# Patient Record
Sex: FEMALE | Race: BLACK OR AFRICAN AMERICAN
[De-identification: names, ages, dates, MRNs, and addresses within clinical notes are randomized per-mention and may not be internally consistent; named-entity substitution may affect disease eponyms.]

---

## 2017-01-14 ENCOUNTER — HOSPITAL ENCOUNTER (EMERGENCY)
Dept: HOSPITAL 61 - ER | Age: 22
Discharge: HOME | End: 2017-01-14
Payer: COMMERCIAL

## 2017-01-14 VITALS — DIASTOLIC BLOOD PRESSURE: 81 MMHG | SYSTOLIC BLOOD PRESSURE: 130 MMHG

## 2017-01-14 VITALS — BODY MASS INDEX: 20.83 KG/M2 | HEIGHT: 65 IN | WEIGHT: 125 LBS

## 2017-01-14 DIAGNOSIS — N93.9: Primary | ICD-10-CM

## 2017-01-14 LAB
ALBUMIN SERPL-MCNC: 3.5 G/DL (ref 3.4–5)
ALP SERPL-CCNC: 39 U/L (ref 46–116)
ALT SERPL-CCNC: 18 U/L (ref 14–59)
ANION GAP SERPL CALC-SCNC: 8 MMOL/L (ref 6–14)
AST SERPL-CCNC: 12 U/L (ref 15–37)
BACTERIA #/AREA URNS HPF: (no result) /HPF
BASOPHILS # BLD AUTO: 0 X10^3/UL (ref 0–0.2)
BASOPHILS NFR BLD: 0 % (ref 0–3)
BILIRUB DIRECT SERPL-MCNC: 0.1 MG/DL (ref 0–0.2)
BILIRUB SERPL-MCNC: 0.4 MG/DL (ref 0.2–1)
BILIRUB UR QL STRIP: NEGATIVE
BUN SERPL-MCNC: 10 MG/DL (ref 7–20)
CALCIUM SERPL-MCNC: 8.7 MG/DL (ref 8.5–10.1)
CHLORIDE SERPL-SCNC: 108 MMOL/L (ref 98–107)
CO2 SERPL-SCNC: 26 MMOL/L (ref 21–32)
CREAT SERPL-MCNC: 0.9 MG/DL (ref 0.6–1)
EOSINOPHIL NFR BLD: 2 % (ref 0–3)
ERYTHROCYTE [DISTWIDTH] IN BLOOD BY AUTOMATED COUNT: 17.3 % (ref 11.5–14.5)
GFR SERPLBLD BASED ON 1.73 SQ M-ARVRAT: 79 ML/MIN
GLUCOSE SERPL-MCNC: 86 MG/DL (ref 70–99)
GLUCOSE UR STRIP-MCNC: NEGATIVE MG/DL
HCT VFR BLD CALC: 36.9 % (ref 36–47)
HGB BLD-MCNC: 12 G/DL (ref 12–15.5)
LYMPHOCYTES # BLD: 1.5 X10^3/UL (ref 1–4.8)
LYMPHOCYTES NFR BLD AUTO: 46 % (ref 24–48)
MCH RBC QN AUTO: 24 PG (ref 25–35)
MCHC RBC AUTO-ENTMCNC: 32 G/DL (ref 31–37)
MCV RBC AUTO: 75 FL (ref 79–100)
MONOCYTES NFR BLD: 13 % (ref 0–9)
NEG OBC UR: (no result)
NEUTROPHILS NFR BLD AUTO: 40 % (ref 31–73)
NITRITE UR QL STRIP: NEGATIVE
PH UR STRIP: 6 [PH]
PLATELET # BLD AUTO: 305 X10^3/UL (ref 140–400)
POS OBC UR: (no result)
POTASSIUM SERPL-SCNC: 3.5 MMOL/L (ref 3.5–5.1)
PROT SERPL-MCNC: 7.4 G/DL (ref 6.4–8.2)
PROT UR STRIP-MCNC: NEGATIVE MG/DL
RBC # BLD AUTO: 4.95 X10^6/UL (ref 3.5–5.4)
RBC #/AREA URNS HPF: (no result) /HPF (ref 0–2)
SODIUM SERPL-SCNC: 142 MMOL/L (ref 136–145)
SP GR UR STRIP: 1.02
SQUAMOUS #/AREA URNS LPF: (no result) /LPF
UROBILINOGEN UR-MCNC: 0.2 MG/DL
WBC # BLD AUTO: 3.4 X10^3/UL (ref 4–11)

## 2017-01-14 PROCEDURE — 87186 SC STD MICRODIL/AGAR DIL: CPT

## 2017-01-14 PROCEDURE — 83690 ASSAY OF LIPASE: CPT

## 2017-01-14 PROCEDURE — 80076 HEPATIC FUNCTION PANEL: CPT

## 2017-01-14 PROCEDURE — 80048 BASIC METABOLIC PNL TOTAL CA: CPT

## 2017-01-14 PROCEDURE — 86850 RBC ANTIBODY SCREEN: CPT

## 2017-01-14 PROCEDURE — 85027 COMPLETE CBC AUTOMATED: CPT

## 2017-01-14 PROCEDURE — 86901 BLOOD TYPING SEROLOGIC RH(D): CPT

## 2017-01-14 PROCEDURE — 36415 COLL VENOUS BLD VENIPUNCTURE: CPT

## 2017-01-14 PROCEDURE — 87086 URINE CULTURE/COLONY COUNT: CPT

## 2017-01-14 PROCEDURE — 81001 URINALYSIS AUTO W/SCOPE: CPT

## 2017-01-14 PROCEDURE — 81025 URINE PREGNANCY TEST: CPT

## 2017-01-14 PROCEDURE — 84702 CHORIONIC GONADOTROPIN TEST: CPT

## 2017-01-14 PROCEDURE — 86900 BLOOD TYPING SEROLOGIC ABO: CPT

## 2017-01-14 NOTE — PHYS DOC
Past Medical History


Past Medical History:  No Pertinent History


Past Surgical History:  No Surgical History


Alcohol Use:  None


Drug Use:  None





Adult General


Chief Complaint


Chief Complaint:  VAGINAL BLEEDING PREGNANCY





HPI


HPI


21-year-old female presenting to the emergency department with vaginal 

bleeding. She reports being pregnant last menstrual period was at the beginning 

of December. She reports having a positive pregnancy at home and at the 

[facility]. She currently scheduled for an ultrasound in a few weeks. 

Approximately 2 days ago she started having abdominal pain in the lower pelvic 

region. It is associated with cramping. She also reports lower back pain as 

well that time. She then approximate 6 hours after this noted cramping 

sensation with vaginal bleeding. This is been present for the last 6 hours. She 

denies nausea vomiting chest pain or shortness of breath.





The pain is sharp moderate nonradiating and without alleviating factors.





Review of Systems


Review of Systems


ROS negative for chest pain shortness of breath fevers chills. She denies 

cough.  All other review of systems is negative unless otherwise noted in 

history of present illness.





Allergies


Allergies





 Allergies








Coded Allergies Type Severity Reaction Last Updated Verified


 


  No Known Drug Allergies    1/14/17 No











Physical Exam


Physical Exam





Constitutional: Well developed, well nourished, no acute distress, non-toxic 

appearance. 


HENT: Normocephalic, atraumatic, bilateral external ears normal, oropharynx 

moist, no oral exudates, nose normal. []


Eyes: PERRLA, EOMI, conjunctiva normal, no discharge.  


Neck: Normal range of motion, no tenderness, supple, no stridor. [] 


Cardiovascular:Heart rate regular rhythm, no murmur []


Lungs & Thorax:  Bilateral breath sounds clear to auscultation 


Abdomen:  Soft nontender abdomen without rebound tenderness or guarding 

present. Negative McBurneys point. Negative Calhoun sign. No ecchymosis present.


Vaginal exam performed in the presence of a female nurse shows bleeding in the 

vaginal vault. Cervical os is unavailable to be visualized due to bleeding.


Skin: Warm, dry, no erythema, no rash. [] 


Back: No tenderness, no CVA tenderness.  


Extremities: No tenderness, no cyanosis, no clubbing, ROM intact, no edema. [] 


Neurologic: Alert and oriented X 3, normal motor function, normal sensory 

function, no focal deficits noted. []


Psychologic: Affect normal, judgement normal, mood normal. []





Current Patient Data


Vital Signs





 Vital Signs








  Date Time  Temp Pulse Resp B/P Pulse Ox O2 Delivery O2 Flow Rate FiO2


 


1/14/17 09:57 97.9 98 14 130/81 100 Room Air  





 97.9       








Lab Values





 Laboratory Tests








Test


  1/14/17


10:06 1/14/17


10:19


 


Urine Collection Type Void   


 


Urine Color Yellow   


 


Urine Clarity Clear   


 


Urine pH 6.0   


 


Urine Specific Gravity 1.025   


 


Urine Protein


  Negativemg/dL


(NEG-TRACE) 


 


 


Urine Glucose (UA)


  Negativemg/dL


(NEG) 


 


 


Urine Ketones (Stick)


  Negativemg/dL


(NEG) 


 


 


Urine Blood Large (NEG)   


 


Urine Nitrite


  Negative (NEG)


  


 


 


Urine Bilirubin


  Negative (NEG)


  


 


 


Urine Urobilinogen Dipstick


  0.2mg/dL (0.2


mg/dL) 


 


 


Urine Leukocyte Esterase


  Moderate (NEG)


  


 


 


Urine RBC


  6-10/HPF (0-2)


  


 


 


Urine WBC


  11-20/HPF


(0-4) 


 


 


Urine Squamous Epithelial


Cells Many/LPF  


  


 


 


Urine Bacteria


  Many/HPF


(0-FEW) 


 


 


Urine Mucus Marked/LPF   


 


Urine Pregnancy Test


  Negative (NEG)


  


 


 


White Blood Count


  


  3.4x10^3/uL


(4.0-11.0)  L


 


Red Blood Count


  


  4.95x10^6/uL


(3.50-5.40)


 


Hemoglobin


  


  12.0g/dL


(12.0-15.5)


 


Hematocrit


  


  36.9%


(36.0-47.0)


 


Mean Corpuscular Volume


  


  75fL ()


L


 


Mean Corpuscular Hemoglobin  24pg (25-35)  L


 


Mean Corpuscular Hemoglobin


Concent 


  32g/dL (31-37)


 


 


Red Cell Distribution Width


  


  17.3%


(11.5-14.5)  H


 


Platelet Count


  


  305x10^3/uL


(140-400)


 


Neutrophils (%) (Auto)  40% (31-73)  


 


Lymphocytes (%) (Auto)  46% (24-48)  


 


Monocytes (%) (Auto)  13% (0-9)  H


 


Eosinophils (%) (Auto)  2% (0-3)  


 


Basophils (%) (Auto)  0% (0-3)  


 


Neutrophils # (Auto)


  


  1.3x10^3uL


(1.8-7.7)  L


 


Lymphocytes # (Auto)


  


  1.5x10^3/uL


(1.0-4.8)


 


Monocytes # (Auto)


  


  0.4x10^3/uL


(0.0-1.1)


 


Eosinophils # (Auto)


  


  0.1x10^3/uL


(0.0-0.7)


 


Basophils # (Auto)


  


  0.0x10^3/uL


(0.0-0.2)


 


Maternal Serum HCG Beta


Subunit 


  < 1mIU/mL


(0-6)


 


Sodium Level


  


  142mmol/L


(136-145)


 


Potassium Level


  


  3.5mmol/L


(3.5-5.1)


 


Chloride Level


  


  108mmol/L


()  H


 


Carbon Dioxide Level


  


  26mmol/L


(21-32)


 


Anion Gap  8 (6-14)  


 


Blood Urea Nitrogen


  


  10mg/dL (7-20)


 


 


Creatinine


  


  0.9mg/dL


(0.6-1.0)


 


Estimated GFR


(Cockcroft-Gault) 


  79.0  


 


 


Glucose Level


  


  86mg/dL


(70-99)


 


Calcium Level


  


  8.7mg/dL


(8.5-10.1)


 


Total Bilirubin


  


  0.4mg/dL


(0.2-1.0)


 


Direct Bilirubin


  


  0.1mg/dL


(0.0-0.2)


 


Aspartate Amino Transferase


(AST) 


  12U/L (15-37)


L


 


Alanine Aminotransferase (ALT)  18U/L (14-59)  


 


Alkaline Phosphatase


  


  39U/L ()


L


 


Total Protein


  


  7.4g/dL


(6.4-8.2)


 


Albumin


  


  3.5g/dL


(3.4-5.0)


 


Lipase


  


  131U/L


()





 Laboratory Tests


1/14/17 10:19








 Laboratory Tests


1/14/17 10:19














EKG


EKG


[]





Radiology/Procedures


Radiology/Procedures


[]





Course & Med Decision Making


Course & Med Decision Making


Pertinent Labs and Imaging studies reviewed. (See chart for details)





21-year-old female presenting to the emergency department today with vaginal 

bleeding and abdominal pain. She reported positive pregnancy test previously. 

Here her urinary pregnancy test was negative. She denies any history of vaginal 

bleeding over the last 2 weeks or so. It is unusual that her urine pregnancy 

test would be negative if this were an undergoing current miscarriage. On 

evaluation the patient's vital signs were unremarkable. Physical exam showed 

bleeding on vaginal exam otherwise unremarkable. Labs were obtained which 

showed normal CBC with mild leukopenia. Urinalysis shows possible urinary tract 

infection. Chemistry panel unremarkable. Blood type shows Rh+. Beta hCG both in 

urine and blood were negative for pregnancy. I informed her of the findings. I 

then referred her to our gynecology/obstetricians for further evaluation in the 

outpatient setting. She is provided oral pain medication upon discharge.





Dragon Disclaimer


Dragon Disclaimer


This electronic medical record was generated, in whole or in part, using a 

voice recognition dictation system.





Departure


Departure


Impression:  


 Primary Impression:  


 Vaginal bleeding


Disposition:  01 HOME, SELF-CARE


Condition:  STABLE


Referrals:  


NO PCP (PCP)








YENY BIRCH Jr, MD


Patient Instructions:  Vaginal Bleeding During Pregnancy, First Trimester





Additional Instructions:


Thank you for allowing us to participate in your care today.





Followup with your primary care physician in 3 days if your symptoms do not 

improve. If you do not have a primary care provider you can ask for a list of 

our primary care providers. Return to the emergency department you have any new 

or concerning findings.





This should be evaluated by the primary care physician and any necessary 

consulting services for continued management within a few days after discharge. 

Return to emergency room if you have any  new or concerning symptoms including 

but not limited to fever, chills, nausea, vomiting, intractable pain, any new 

rashes, chest pain, shortness of air, uncontrolled bleeding, difficulty 

breathing, and/or vision loss.





You may have been prescribed medication that can change in your level of 

thinking and ability to operate machinery. These medications include 

hydrocodone and Ativan. Also, Benadryl has been known to do this as well. Be 

sure to check with your pharmacist and ask if the medications you've prescribed 

can affect your level of consciousness. I recommend not operating heavy 

machinery or driving while on medication such as these.


Scripts


Nitrofurantoin Monohyd/M-Cryst (Macrobid 100 Mg Capsule)100 Mg Capsule1 Cap PO 

BID #10 CAP


   Prov:BLAISE JEREZ MD         1/14/17


Hydrocodone Bit/Acetaminophen (Hydrocodone-Apap 5-325  **)1 Each Tablet1 Tab PO 

PRN Q6HRS PRN PAIN #8 TAB


   Be careful as this medication may cause you to be drowsy or


   tired. Do not drive on this medication.


   Prov:BLAISE JEREZ MD         1/14/17








BLAISE JEREZ MD Jan 14, 2017 11:15

## 2017-01-16 NOTE — VNOTE
CALL BACK NOTE


CALL BACK





Microbiology


1/14/17 Urine Culture - Final, Complete


          


1/14/17 Urine Culture Result 1 (KENNY) - Final, Complete


          


1/14/17 Antimicrobic Susceptibility - Final, Complete


          


She was seen here for uterine bleeding. She was placed on Macrobid at 

discharge. Urine culture was positive for Escherichia coli with susceptibility 

noted with Macrobid no change in antibiotics needed at this time.








MARY ANN COLLINS NP Jan 16, 2017 13:58

## 2018-06-04 ENCOUNTER — HOSPITAL ENCOUNTER (EMERGENCY)
Dept: HOSPITAL 61 - ER | Age: 23
Discharge: HOME | End: 2018-06-04
Payer: SELF-PAY

## 2018-06-04 DIAGNOSIS — A08.4: Primary | ICD-10-CM

## 2018-06-04 DIAGNOSIS — J20.9: ICD-10-CM

## 2018-06-04 LAB — URINE HCG POC: (no result)

## 2018-06-04 PROCEDURE — 99283 EMERGENCY DEPT VISIT LOW MDM: CPT

## 2018-06-04 PROCEDURE — 81025 URINE PREGNANCY TEST: CPT

## 2021-12-30 ENCOUNTER — HOSPITAL ENCOUNTER (EMERGENCY)
Dept: HOSPITAL 61 - ER | Age: 26
Discharge: HOME | End: 2021-12-30
Payer: SELF-PAY

## 2021-12-30 VITALS — DIASTOLIC BLOOD PRESSURE: 59 MMHG | SYSTOLIC BLOOD PRESSURE: 103 MMHG

## 2021-12-30 VITALS — BODY MASS INDEX: 21.8 KG/M2 | WEIGHT: 123.02 LBS | HEIGHT: 63 IN

## 2021-12-30 DIAGNOSIS — U07.1: Primary | ICD-10-CM

## 2021-12-30 DIAGNOSIS — B34.9: ICD-10-CM

## 2021-12-30 LAB
ALBUMIN SERPL-MCNC: 3.9 G/DL (ref 3.4–5)
ALBUMIN/GLOB SERPL: 1.1 {RATIO} (ref 1–1.7)
ALP SERPL-CCNC: 44 U/L (ref 46–116)
ALT SERPL-CCNC: 24 U/L (ref 14–59)
ANION GAP SERPL CALC-SCNC: 15 MMOL/L (ref 6–14)
APTT PPP: YELLOW S
AST SERPL-CCNC: 13 U/L (ref 15–37)
BACTERIA #/AREA URNS HPF: 0 /HPF
BASOPHILS # BLD AUTO: 0 X10^3/UL (ref 0–0.2)
BASOPHILS NFR BLD: 1 % (ref 0–3)
BILIRUB SERPL-MCNC: 0.3 MG/DL (ref 0.2–1)
BILIRUB UR QL STRIP: NEGATIVE
BUN SERPL-MCNC: 6 MG/DL (ref 7–20)
BUN/CREAT SERPL: 7 (ref 6–20)
CALCIUM SERPL-MCNC: 8.5 MG/DL (ref 8.5–10.1)
CHLORIDE SERPL-SCNC: 101 MMOL/L (ref 98–107)
CO2 SERPL-SCNC: 22 MMOL/L (ref 21–32)
CREAT SERPL-MCNC: 0.9 MG/DL (ref 0.6–1)
EOSINOPHIL NFR BLD: 0 X10^3/UL (ref 0–0.7)
EOSINOPHIL NFR BLD: 1 % (ref 0–3)
ERYTHROCYTE [DISTWIDTH] IN BLOOD BY AUTOMATED COUNT: 16.6 % (ref 11.5–14.5)
FIBRINOGEN PPP-MCNC: CLEAR MG/DL
GFR SERPLBLD BASED ON 1.73 SQ M-ARVRAT: 91.6 ML/MIN
GLUCOSE SERPL-MCNC: 100 MG/DL (ref 70–99)
HCT VFR BLD CALC: 30.9 % (ref 36–47)
HGB BLD-MCNC: 10.5 G/DL (ref 12–15.5)
INFLUENZA A PATIENT: NEGATIVE
INFLUENZA B PATIENT: NEGATIVE
LYMPHOCYTES # BLD: 0.2 X10^3/UL (ref 1–4.8)
LYMPHOCYTES NFR BLD AUTO: 5 % (ref 24–48)
MCH RBC QN AUTO: 26 PG (ref 25–35)
MCHC RBC AUTO-ENTMCNC: 34 G/DL (ref 31–37)
MCV RBC AUTO: 76 FL (ref 79–100)
MONO #: 0.5 X10^3/UL (ref 0–1.1)
MONOCYTES NFR BLD: 11 % (ref 0–9)
NEUT #: 3.8 X10^3/UL (ref 1.8–7.7)
NEUTROPHILS NFR BLD AUTO: 83 % (ref 31–73)
NITRITE UR QL STRIP: NEGATIVE
PH UR STRIP: 6.5 [PH]
PLATELET # BLD AUTO: 245 X10^3/UL (ref 140–400)
POTASSIUM SERPL-SCNC: 3.6 MMOL/L (ref 3.5–5.1)
PROT SERPL-MCNC: 7.6 G/DL (ref 6.4–8.2)
PROT UR STRIP-MCNC: NEGATIVE MG/DL
RBC # BLD AUTO: 4.07 X10^6/UL (ref 3.5–5.4)
RBC #/AREA URNS HPF: 0 /HPF (ref 0–2)
SODIUM SERPL-SCNC: 138 MMOL/L (ref 136–145)
UROBILINOGEN UR-MCNC: 0.2 MG/DL
WBC # BLD AUTO: 4.6 X10^3/UL (ref 4–11)
WBC #/AREA URNS HPF: (no result) /HPF (ref 0–4)

## 2021-12-30 PROCEDURE — 87426 SARSCOV CORONAVIRUS AG IA: CPT

## 2021-12-30 PROCEDURE — 85025 COMPLETE CBC W/AUTO DIFF WBC: CPT

## 2021-12-30 PROCEDURE — 81001 URINALYSIS AUTO W/SCOPE: CPT

## 2021-12-30 PROCEDURE — 87804 INFLUENZA ASSAY W/OPTIC: CPT

## 2021-12-30 PROCEDURE — 99283 EMERGENCY DEPT VISIT LOW MDM: CPT

## 2021-12-30 PROCEDURE — 96361 HYDRATE IV INFUSION ADD-ON: CPT

## 2021-12-30 PROCEDURE — 87086 URINE CULTURE/COLONY COUNT: CPT

## 2021-12-30 PROCEDURE — U0003 INFECTIOUS AGENT DETECTION BY NUCLEIC ACID (DNA OR RNA); SEVERE ACUTE RESPIRATORY SYNDROME CORONAVIRUS 2 (SARS-COV-2) (CORONAVIRUS DISEASE [COVID-19]), AMPLIFIED PROBE TECHNIQUE, MAKING USE OF HIGH THROUGHPUT TECHNOLOGIES AS DESCRIBED BY CMS-2020-01-R: HCPCS

## 2021-12-30 PROCEDURE — 96374 THER/PROPH/DIAG INJ IV PUSH: CPT

## 2021-12-30 PROCEDURE — 80053 COMPREHEN METABOLIC PANEL: CPT

## 2021-12-30 PROCEDURE — 36415 COLL VENOUS BLD VENIPUNCTURE: CPT

## 2021-12-30 NOTE — PHYS DOC
Past Medical History


Past Medical History:  No Pertinent History


Past Surgical History:  No Surgical History


Smoking Status:  Never Smoker


Alcohol Use:  None


Drug Use:  None





General Adult


EDM:


Chief Complaint:  FLU SYMPTOM





HPI:


HPI:





Patient is a 26 year old female who presents with congestion, headache, 

diarrhea, cough and body aches.  Her symptoms started over 1 week ago with 

congestion, since that time she has developed her other symptoms.  Only the last

2 days she has had cough and body aches.  She reports that she has low back pain

that radiates across her groin into her bilateral lower extremities.  She denies

fever, chills, sore throat, chest pain, palpitations, shortness of breath, vo

miting or constipation. 





Patient reports that she has had kidney stones in the past, but this is not 

similar to the renal colicky pain associated.  She is also currently on her 

menstrual period.





Review of Systems:


Review of Systems:


Constitutional:   See HPI


Eyes:   Denies change in visual acuity or visual field deficits.


HENT:   See HPI


Respiratory:   See HPI


Cardiovascular:   See HPI


GI:   See HPI


:  Denies dysuria or hematuria. 


Musculoskeletal:   See HPI 


Integument:   Denies rash or other skin lesions.


Neurologic:   Denies headache, focal weakness or sensory changes.





Heart Score:


C/O Chest Pain:  No





Allergies:


Allergies:





Allergies








Coded Allergies Type Severity Reaction Last Updated Verified


 


  No Known Drug Allergies    6/4/18 No











Physical Exam:


PE:





Constitutional: Well developed, well nourished, no acute distress, patient is 

ill-appearing.


HENT: Normocephalic, atraumatic, bilateral external ears without deformity or 

discharge, oropharynx moist, no oral exudates, nose without deformity or 

discharge, bilateral turbinates nonerythematous.


Eyes: PERRLA, EOMI, conjunctiva normal, no discharge. 


Neck: Normal range of motion, no tenderness, supple, no stridor. 


Cardiovascular: Heart rate regular rhythm, no murmur.


Lungs & Thorax: Bilateral breath sounds clear to auscultation.


Abdomen: Bowel sounds normal, soft, no tenderness, no masses, no pulsatile 

masses.


Skin: Warm, dry, no erythema, no rash. 


Back: No step-off, no midline tenderness, no paraspinal tenderness, no CVA t

enderness. 


Extremities: No tenderness, no cyanosis, no clubbing, ROM intact, no edema, no 

deformity, distal pulses intact.





Current Patient Data:


Labs:





Laboratory Tests








Test


 12/30/21


11:30 12/30/21


11:35 12/30/21


12:02


 


White Blood Count


 4.6 x10^3/uL


(4.0-11.0) 


 





 


Red Blood Count


 4.07 x10^6/uL


(3.50-5.40) 


 





 


Hemoglobin


 10.5 g/dL


(12.0-15.5) 


 





 


Hematocrit


 30.9 %


(36.0-47.0) 


 





 


Mean Corpuscular Volume 76 fL ()   


 


Mean Corpuscular Hemoglobin 26 pg (25-35)   


 


Mean Corpuscular Hemoglobin


Concent 34 g/dL


(31-37) 


 





 


Red Cell Distribution Width


 16.6 %


(11.5-14.5) 


 





 


Platelet Count


 245 x10^3/uL


(140-400) 


 





 


Neutrophils (%) (Auto) 83 % (31-73)   


 


Lymphocytes (%) (Auto) 5 % (24-48)   


 


Monocytes (%) (Auto) 11 % (0-9)   


 


Eosinophils (%) (Auto) 1 % (0-3)   


 


Basophils (%) (Auto) 1 % (0-3)   


 


Neutrophils # (Auto)


 3.8 x10^3/uL


(1.8-7.7) 


 





 


Lymphocytes # (Auto)


 0.2 x10^3/uL


(1.0-4.8) 


 





 


Monocytes # (Auto)


 0.5 x10^3/uL


(0.0-1.1) 


 





 


Eosinophils # (Auto)


 0.0 x10^3/uL


(0.0-0.7) 


 





 


Basophils # (Auto)


 0.0 x10^3/uL


(0.0-0.2) 


 





 


Sodium Level


 138 mmol/L


(136-145) 


 





 


Potassium Level


 3.6 mmol/L


(3.5-5.1) 


 





 


Chloride Level


 101 mmol/L


() 


 





 


Carbon Dioxide Level


 22 mmol/L


(21-32) 


 





 


Anion Gap 15 (6-14)   


 


Blood Urea Nitrogen 6 mg/dL (7-20)   


 


Creatinine


 0.9 mg/dL


(0.6-1.0) 


 





 


Estimated GFR


(Cockcroft-Gault) 91.6 


 


 





 


BUN/Creatinine Ratio 7 (6-20)   


 


Glucose Level


 100 mg/dL


(70-99) 


 





 


Calcium Level


 8.5 mg/dL


(8.5-10.1) 


 





 


Total Bilirubin


 0.3 mg/dL


(0.2-1.0) 


 





 


Aspartate Amino Transf


(AST/SGOT) 13 U/L (15-37) 


 


 





 


Alanine Aminotransferase


(ALT/SGPT) 24 U/L (14-59) 


 


 





 


Alkaline Phosphatase


 44 U/L


() 


 





 


Total Protein


 7.6 g/dL


(6.4-8.2) 


 





 


Albumin


 3.9 g/dL


(3.4-5.0) 


 





 


Albumin/Globulin Ratio 1.1 (1.0-1.7)   


 


SARS-CoV-2 Antigen (Rapid)


 


 Negative


(NEGATIVE) 





 


Urine Collection Type   Unknown 


 


Urine Color   Yellow 


 


Urine Clarity   Clear 


 


Urine pH   6.5 (<5.0-8.0) 


 


Urine Specific Gravity


 


 


 1.025


(1.000-1.030)


 


Urine Protein


 


 


 Negative mg/dL


(NEG-TRACE)


 


Urine Glucose (UA)


 


 


 Negative mg/dL


(NEG)


 


Urine Ketones (Stick)


 


 


 >=80 mg/dL


(NEG)


 


Urine Blood   Negative (NEG) 


 


Urine Nitrite   Negative (NEG) 


 


Urine Bilirubin   Negative (NEG) 


 


Urine Urobilinogen Dipstick


 


 


 0.2 mg/dL (0.2


mg/dL)


 


Urine Leukocyte Esterase   Small (NEG) 


 


Urine RBC   0 /HPF (0-2) 


 


Urine WBC


 


 


 5-10 /HPF


(0-4)


 


Urine Squamous Epithelial


Cells 


 


 Many /LPF 





 


Urine Bacteria   0 /HPF (0-FEW) 


 


Urine Mucus   Marked /LPF 








Vital Signs:





                                   Vital Signs








  Date Time  Temp Pulse Resp B/P (MAP) Pulse Ox O2 Delivery O2 Flow Rate FiO2


 


12/30/21 11:23 99.3 77 18 121/68 (85) 100 Room Air  





 99.3       











Course & Med Decision Making:


Course & Med Decision Making


Pertinent Labs and Imaging studies reviewed. (See chart for details)





Otherwise healthy 26-year-old female presents emergency department with multiple

complaints concerning for viral illness, differential includes COVID-19 

infection.





Rapid COVID-19 and flu swabs came back negative, but advised patient that she 

needs to wait for PCR COVID-19 test result.  Patient agrees to quarantine until 

test results become available.  Counseled patient on supportive treatment for 

viral illness.  Patient understands and is agreeable to discharge plan.





Dragon Disclaimer:


Dragon Disclaimer:


This electronic medical record was generated, in whole or in part, using a voice

recognition dictation system.





Departure


Departure


Impression:  


   Primary Impression:  


   Viral syndrome


Disposition:  01 HOME / SELF CARE / HOMELESS


Condition:  STABLE


Referrals:  


NO PCP (PCP)


Patient Instructions:  Viral Syndrome





Additional Instructions:  


Follow the following supportive treatment measures:


 - Cool mist humidifier with plain water at bedside while you sleep


 - Mucinex (guaifenesin) per box instructions


 - Tessalon perles (benzonatate) for cough, especially at night before bed





You have been tested for COVID-19. It is an infection caused by a new type of 

coronavirus. COVID-19 will cause cold-like or mild flu symptoms in most. It can 

cause more severe symptoms like problems breathing in some. There is no 

treatment for COVID-19. The body will clear the infection over time. Self-care 

will help to ease discomfort.





Steps to Take:


 - Rest as needed. 


 - Choose healthy foods including fruits and vegetables. Drink water throughout 

the day.


 - Get plenty of sleep each night.


 - If you smoke, try to quit. It may ease breathing.


 - Avoid alcohol.


 - Keep Others Healthy


 - The virus can spread to others. Droplets are released every time you sneeze 

or cough. The droplets can get into the mouth, nose, or eyes of people near you 

and lead to infection. To lower the chances of spreading COVID-19 to others:





Stay at home until your doctor has said it is safe to leave. If you tested 

positive this will mean staying isolated until both of the following are true:


 - At least 7 days have passed since the start of illness.


 - You are free of fever for at least 72 hours without the use of medicine.





During this time:


 - Avoid public areas, events, or transportation. Do not return to work or 

school until your doctor has said it is safe to do so.


 - Call ahead if you need to go to a medical center. Let them know you may have 

COVID-19. It will help them guide you where to go. They may also ask you to wear

a facemask when you come to the office.


 - If you call for emergency medical services, let them know you may have COVID-

19.





While at home:


 - Try to avoid close contact with others. Stay about 6 feet away.


 - If possible, spend most of your time in a separate room from others.


 - Use a face mask if you will be in close contact with others such as sharing a

room or vehicle.


 - Have someone wipe down common surfaces in the home. Use household  

every day on areas like doorknobs, counters, or sinks.


 - Cough or sneeze into a tissue. Throw the tissue away right after use. If a 

tissue is not available, cough or sneeze into your elbow.


 - Wash your hands often. Wash them after sneezing or coughing. Use soap and 

water and wash or at least 20 seconds. Alcohol based hand  can be used if

soap and water is not available.


 - Do not prepare food for others. Avoid sharing personal items like forks, 

spoons, or toothbrushes.


 - Avoid close contact with pets while you are sick. There is no evidence of the

virus passing to pets. This is a safety step until more is known about this 

virus.


 - Isolation can be frustrating. Social interaction can help. Keep in touch with

friends and family through phone and tech options. You can still interact with 

others in your home, just keep a safe distance of about 6 feet.





Follow-up:


 - Your doctors office will check in with you to see if there are any changes 

in your health. 


 - You may be asked to keep track of symptoms to share with them. They will also

let you know when you are clear to be in public again.





Contact your doctor if your recovery is not going as you expect. Get emergency 

care if you have problems such as:


 - Trouble breathing with oxygen saturation <90%


 - Nonstop chest pain or pressure


 - Changes in awareness, confusion, or problems waking


 - Lips or face have bluish color


 - Worsening of symptoms





If you think you have an emergency, call for emergency medical services right 

away.





As taken from [facility]


Scripts


Benzonatate (BENZONATATE) 100 Mg Capsule


1-2 CAP PO HS, #20 CAP


   Prov: MARLEEN BOWERS         12/30/21











MARLEEN BOWERS              Dec 30, 2021 12:01